# Patient Record
Sex: FEMALE | URBAN - METROPOLITAN AREA
[De-identification: names, ages, dates, MRNs, and addresses within clinical notes are randomized per-mention and may not be internally consistent; named-entity substitution may affect disease eponyms.]

---

## 2024-08-12 ENCOUNTER — CLAIMS CREATED FROM THE CLAIM WINDOW (OUTPATIENT)
Dept: URBAN - METROPOLITAN AREA MEDICAL CENTER 1 | Facility: MEDICAL CENTER | Age: 69
End: 2024-08-12
Payer: COMMERCIAL

## 2024-08-12 DIAGNOSIS — K21.9 ACID REFLUX: ICD-10-CM

## 2024-08-12 DIAGNOSIS — R13.19 CERVICAL DYSPHAGIA: ICD-10-CM

## 2024-08-12 PROCEDURE — G8427 DOCREV CUR MEDS BY ELIG CLIN: HCPCS | Performed by: REGISTERED NURSE

## 2024-08-12 PROCEDURE — 99222 1ST HOSP IP/OBS MODERATE 55: CPT | Performed by: REGISTERED NURSE

## 2024-08-12 PROCEDURE — 99254 IP/OBS CNSLTJ NEW/EST MOD 60: CPT | Performed by: REGISTERED NURSE

## 2024-08-13 ENCOUNTER — CLAIMS CREATED FROM THE CLAIM WINDOW (OUTPATIENT)
Dept: URBAN - METROPOLITAN AREA MEDICAL CENTER 1 | Facility: MEDICAL CENTER | Age: 69
End: 2024-08-13
Payer: COMMERCIAL

## 2024-08-13 ENCOUNTER — OUT OF OFFICE VISIT (OUTPATIENT)
Dept: URBAN - METROPOLITAN AREA MEDICAL CENTER 1 | Facility: MEDICAL CENTER | Age: 69
End: 2024-08-13

## 2024-08-13 DIAGNOSIS — K31.7 BENIGN GASTRIC POLYP: ICD-10-CM

## 2024-08-13 DIAGNOSIS — K22.4 ESOPHAGEAL DYSMOTILITY: ICD-10-CM

## 2024-08-13 DIAGNOSIS — R13.19 CERVICAL DYSPHAGIA: ICD-10-CM

## 2024-08-13 PROCEDURE — 43450 DILATE ESOPHAGUS 1/MULT PASS: CPT | Performed by: INTERNAL MEDICINE

## 2024-08-13 PROCEDURE — 43239 EGD BIOPSY SINGLE/MULTIPLE: CPT | Performed by: INTERNAL MEDICINE

## 2024-08-13 PROCEDURE — 43235 EGD DIAGNOSTIC BRUSH WASH: CPT | Performed by: INTERNAL MEDICINE

## 2024-08-13 PROCEDURE — 99232 SBSQ HOSP IP/OBS MODERATE 35: CPT | Performed by: INTERNAL MEDICINE

## 2024-08-14 ENCOUNTER — CLAIMS CREATED FROM THE CLAIM WINDOW (OUTPATIENT)
Dept: URBAN - METROPOLITAN AREA MEDICAL CENTER 1 | Facility: MEDICAL CENTER | Age: 69
End: 2024-08-14
Payer: COMMERCIAL

## 2024-08-14 DIAGNOSIS — K58.1 IRRITABLE BOWEL SYNDROME WITH CONSTIPATION: ICD-10-CM

## 2024-08-14 DIAGNOSIS — K22.4 ESOPHAGEAL DYSMOTILITY: ICD-10-CM

## 2024-08-14 DIAGNOSIS — R13.19 CERVICAL DYSPHAGIA: ICD-10-CM

## 2024-08-14 DIAGNOSIS — K21.9 ACID REFLUX: ICD-10-CM

## 2024-08-14 PROCEDURE — 99232 SBSQ HOSP IP/OBS MODERATE 35: CPT | Performed by: REGISTERED NURSE

## 2024-08-21 ENCOUNTER — OFFICE VISIT (OUTPATIENT)
Dept: URBAN - NONMETROPOLITAN AREA CLINIC 2 | Facility: CLINIC | Age: 69
End: 2024-08-21
Payer: COMMERCIAL

## 2024-08-21 ENCOUNTER — DASHBOARD ENCOUNTERS (OUTPATIENT)
Age: 69
End: 2024-08-21

## 2024-08-21 VITALS
SYSTOLIC BLOOD PRESSURE: 130 MMHG | DIASTOLIC BLOOD PRESSURE: 82 MMHG | WEIGHT: 107.4 LBS | BODY MASS INDEX: 17.9 KG/M2 | HEART RATE: 80 BPM | HEIGHT: 65 IN

## 2024-08-21 DIAGNOSIS — K21.9 GASTROESOPHAGEAL REFLUX DISEASE, UNSPECIFIED WHETHER ESOPHAGITIS PRESENT: ICD-10-CM

## 2024-08-21 DIAGNOSIS — Z12.11 SCREENING FOR COLON CANCER: ICD-10-CM

## 2024-08-21 DIAGNOSIS — R13.19 OTHER DYSPHAGIA: ICD-10-CM

## 2024-08-21 PROBLEM — 40739000: Status: ACTIVE | Noted: 2024-08-21

## 2024-08-21 PROBLEM — 305058001: Status: ACTIVE | Noted: 2024-08-21

## 2024-08-21 PROBLEM — 235595009: Status: ACTIVE | Noted: 2024-08-21

## 2024-08-21 PROCEDURE — 99213 OFFICE O/P EST LOW 20 MIN: CPT

## 2024-08-21 RX ORDER — PANTOPRAZOLE SODIUM 40 MG/1
1 TABLET TABLET, DELAYED RELEASE ORAL ONCE A DAY
Status: ACTIVE | COMMUNITY

## 2024-08-21 RX ORDER — LINACLOTIDE 72 UG/1
1 CAPSULE AT LEAST 30 MINUTES BEFORE THE FIRST MEAL OF THE DAY ON AN EMPTY STOMACH CAPSULE, GELATIN COATED ORAL ONCE A DAY
Status: ACTIVE | COMMUNITY

## 2024-08-21 RX ORDER — PANTOPRAZOLE SODIUM 20 MG/1
1 TABLET TABLET, DELAYED RELEASE ORAL TWICE A DAY
Qty: 180 TABLET | Refills: 3 | OUTPATIENT
Start: 2024-08-21

## 2024-08-21 RX ORDER — SODIUM CHLORIDE TAB 1 GM 1 G
AS DIRECTED TAB MISCELLANEOUS
Status: ACTIVE | COMMUNITY

## 2024-08-21 NOTE — HPI-TODAY'S VISIT:
8/21/2024 Evelin returns following hospital work up for dysphagia with EGD 8/13/24 with Dr. Hauser with dilation and thickened esophageal wall concerning for scleroderma. She was felt to have a complex case with some dysphagia component including her hardware from neck fusions. She continues with modifications, having trouble with noodles and chicken notably. She has had several hospitalizations this year following her syncopal episode with brain hemorrhage and will follow up with  Cardiology in Sept to review her monitor. She no longer sees Neurosurgery and continues with Nephrology for SIADH. States she is  Eating better at home than she did at the hospital and Continues pantoprazole 40 mg she takes in the evenings which upset her stomach for some days. Due for colonoscopy 2026- up to date with previous done at outside location. Taking Linzess 72 mcg but not daily to manage constipation. Denies any blood in her stool or pain with swallowing.She denies abdominal pain but endorses increased borborygmi. She will see SLP today at her home for evaluation and recommendations. She continues working toward regaining the weight she has lost since her fall. SP

## 2024-09-18 ENCOUNTER — OFFICE VISIT (OUTPATIENT)
Dept: URBAN - NONMETROPOLITAN AREA CLINIC 2 | Facility: CLINIC | Age: 69
End: 2024-09-18
Payer: COMMERCIAL

## 2024-09-18 VITALS
SYSTOLIC BLOOD PRESSURE: 147 MMHG | HEART RATE: 71 BPM | WEIGHT: 106.8 LBS | DIASTOLIC BLOOD PRESSURE: 73 MMHG | HEIGHT: 65 IN | BODY MASS INDEX: 17.79 KG/M2

## 2024-09-18 DIAGNOSIS — E87.1 HYPONATREMIA: ICD-10-CM

## 2024-09-18 DIAGNOSIS — R13.19 ESOPHAGEAL DYSPHAGIA: ICD-10-CM

## 2024-09-18 DIAGNOSIS — K59.09 CHRONIC CONSTIPATION: ICD-10-CM

## 2024-09-18 PROCEDURE — 99214 OFFICE O/P EST MOD 30 MIN: CPT | Performed by: INTERNAL MEDICINE

## 2024-09-18 RX ORDER — PANTOPRAZOLE SODIUM 40 MG/1
1 TABLET TABLET, DELAYED RELEASE ORAL ONCE A DAY
Status: ACTIVE | COMMUNITY

## 2024-09-18 RX ORDER — SODIUM CHLORIDE TAB 1 GM 1 G
AS DIRECTED TAB MISCELLANEOUS
Status: ACTIVE | COMMUNITY

## 2024-09-18 RX ORDER — PANTOPRAZOLE SODIUM 20 MG/1
1 TABLET TABLET, DELAYED RELEASE ORAL TWICE A DAY
Qty: 180 TABLET | Refills: 3 | Status: ACTIVE | COMMUNITY
Start: 2024-08-21

## 2024-09-18 RX ORDER — LINACLOTIDE 72 UG/1
1 CAPSULE AT LEAST 30 MINUTES BEFORE THE FIRST MEAL OF THE DAY ON AN EMPTY STOMACH CAPSULE, GELATIN COATED ORAL ONCE A DAY
Status: ACTIVE | COMMUNITY

## 2024-09-18 RX ORDER — POLYETHYLENE GLYCOL 3350, SODIUM SULFATE ANHYDROUS, SODIUM BICARBONATE, SODIUM CHLORIDE, POTASSIUM CHLORIDE 236; 22.74; 6.74; 5.86; 2.97 G/4L; G/4L; G/4L; G/4L; G/4L
4000 MILLITERS POWDER, FOR SOLUTION ORAL ONCE
Qty: 4000 MILLILITER | Refills: 0 | OUTPATIENT
Start: 2024-09-18 | End: 2024-09-19

## 2024-09-18 RX ORDER — LINACLOTIDE 145 UG/1
1 CAPSULE AT LEAST 30 MINUTES BEFORE THE FIRST MEAL OF THE DAY ON AN EMPTY STOMACH CAPSULE, GELATIN COATED ORAL ONCE A DAY
Qty: 30 | Refills: 3 | OUTPATIENT
Start: 2024-09-18 | End: 2025-01-15

## 2024-09-18 NOTE — HPI-TODAY'S VISIT:
9/18/24: Ms. Love returns to GI clinic for evaluation of chronic constipation.  She has been on Linzess 72 mcg daily.  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  She had an EGD with Savary dilation to 54 with Dr. Arroyo and biopsies were normal.  She has a history of cervical neck fusion and suspect this is contributing.  She has a history of baseline constipation but has been more constipated since hospitalization.  She has been straining and her hemorrhoids have been bothering her.  She has some bloating and gas discomfort.    8/21/2024 Evelin returns following hospital work up for dysphagia with EGD 8/13/24 with Dr. Hauser with dilation and thickened esophageal wall concerning for scleroderma. She was felt to have a complex case with some dysphagia component including her hardware from neck fusions. She continues with modifications, having trouble with noodles and chicken notably. She has had several hospitalizations this year following her syncopal episode with brain hemorrhage and will follow up with  Cardiology in Sept to review her monitor. She no longer sees Neurosurgery and continues with Nephrology for SIADH. States she is  Eating better at home than she did at the hospital and Continues pantoprazole 40 mg she takes in the evenings which upset her stomach for some days. Due for colonoscopy 2026- up to date with previous done at outside location.  Taking Linzess 72 mcg but not daily to manage constipation.  Denies any blood in her stool or pain with swallowing.She denies abdominal pain but endorses increased borborygmi. She will see SLP today at her home for evaluation and recommendations.  She continues working toward regaining the weight she has lost since her fall.  SP

## 2024-09-19 ENCOUNTER — TELEPHONE ENCOUNTER (OUTPATIENT)
Dept: URBAN - NONMETROPOLITAN AREA CLINIC 2 | Facility: CLINIC | Age: 69
End: 2024-09-19

## 2024-09-19 ENCOUNTER — WEB ENCOUNTER (OUTPATIENT)
Dept: URBAN - NONMETROPOLITAN AREA CLINIC 2 | Facility: CLINIC | Age: 69
End: 2024-09-19

## 2024-09-19 LAB
BUN/CREATININE RATIO: (no result)
CALCIUM: 9.4
CARBON DIOXIDE: 28
CHLORIDE: 98
CREATININE: 0.57
EGFR: 99
GLUCOSE: 113
MAGNESIUM: 1.9
POTASSIUM: 4.3
SODIUM: 136
UREA NITROGEN (BUN): 7

## 2024-09-24 ENCOUNTER — TELEPHONE ENCOUNTER (OUTPATIENT)
Dept: URBAN - METROPOLITAN AREA CLINIC 118 | Facility: CLINIC | Age: 69
End: 2024-09-24

## 2024-09-24 ENCOUNTER — OFFICE VISIT (OUTPATIENT)
Dept: URBAN - NONMETROPOLITAN AREA CLINIC 2 | Facility: CLINIC | Age: 69
End: 2024-09-24

## 2024-09-24 RX ORDER — LINACLOTIDE 290 UG/1
1 CAPSULE AT LEAST 30 MINUTES BEFORE THE FIRST MEAL OF THE DAY ON AN EMPTY STOMACH CAPSULE, GELATIN COATED ORAL ONCE A DAY
Qty: 30 | Refills: 11 | OUTPATIENT
Start: 2024-09-24 | End: 2025-09-19

## 2024-09-25 ENCOUNTER — WEB ENCOUNTER (OUTPATIENT)
Dept: URBAN - NONMETROPOLITAN AREA CLINIC 2 | Facility: CLINIC | Age: 69
End: 2024-09-25

## 2024-09-25 RX ORDER — LINACLOTIDE 145 UG/1
1 CAPSULE AT LEAST 30 MINUTES BEFORE THE FIRST MEAL OF THE DAY ON AN EMPTY STOMACH CAPSULE, GELATIN COATED ORAL ONCE A DAY
Qty: 90 | Refills: 3 | OUTPATIENT
Start: 2024-09-26 | End: 2025-09-21

## 2024-10-31 ENCOUNTER — OFFICE VISIT (OUTPATIENT)
Dept: URBAN - NONMETROPOLITAN AREA CLINIC 2 | Facility: CLINIC | Age: 69
End: 2024-10-31

## 2024-10-31 VITALS
SYSTOLIC BLOOD PRESSURE: 118 MMHG | TEMPERATURE: 98.1 F | WEIGHT: 106 LBS | BODY MASS INDEX: 17.66 KG/M2 | DIASTOLIC BLOOD PRESSURE: 75 MMHG | HEART RATE: 70 BPM | HEIGHT: 65 IN

## 2024-10-31 RX ORDER — SODIUM CHLORIDE TAB 1 GM 1 G
AS DIRECTED TAB MISCELLANEOUS
Status: ACTIVE | COMMUNITY

## 2024-10-31 RX ORDER — LINACLOTIDE 145 UG/1
1 CAPSULE AT LEAST 30 MINUTES BEFORE THE FIRST MEAL OF THE DAY ON AN EMPTY STOMACH CAPSULE, GELATIN COATED ORAL ONCE A DAY
Qty: 90 | Refills: 3 | Status: ACTIVE | COMMUNITY
Start: 2024-09-26 | End: 2025-09-21

## 2024-10-31 RX ORDER — LINACLOTIDE 290 UG/1
1 CAPSULE AT LEAST 30 MINUTES BEFORE THE FIRST MEAL OF THE DAY ON AN EMPTY STOMACH CAPSULE, GELATIN COATED ORAL ONCE A DAY
Qty: 30 | Refills: 11 | Status: ACTIVE | COMMUNITY
Start: 2024-09-24 | End: 2025-09-19

## 2024-10-31 RX ORDER — LINACLOTIDE 145 UG/1
1 CAPSULE AT LEAST 30 MINUTES BEFORE THE FIRST MEAL OF THE DAY ON AN EMPTY STOMACH CAPSULE, GELATIN COATED ORAL ONCE A DAY
Qty: 30 | Refills: 3 | Status: ACTIVE | COMMUNITY
Start: 2024-09-18 | End: 2025-01-15

## 2024-10-31 RX ORDER — PANTOPRAZOLE SODIUM 40 MG/1
1 TABLET TABLET, DELAYED RELEASE ORAL ONCE A DAY
Status: ACTIVE | COMMUNITY

## 2024-10-31 RX ORDER — LINACLOTIDE 72 UG/1
1 CAPSULE AT LEAST 30 MINUTES BEFORE THE FIRST MEAL OF THE DAY ON AN EMPTY STOMACH CAPSULE, GELATIN COATED ORAL ONCE A DAY
Status: ACTIVE | COMMUNITY

## 2024-10-31 RX ORDER — PANTOPRAZOLE SODIUM 20 MG/1
1 TABLET TABLET, DELAYED RELEASE ORAL TWICE A DAY
Qty: 180 TABLET | Refills: 3 | Status: ACTIVE | COMMUNITY
Start: 2024-08-21

## 2024-10-31 NOTE — HPI-TODAY'S VISIT:
8/21/2024 Evelin returns following hospital work up for dysphagia with EGD 8/13/24 with Dr. Hauser with dilation and thickened esophageal wall concerning for scleroderma. She was felt to have a complex case with some dysphagia component including her hardware from neck fusions. She continues with modifications, having trouble with noodles and chicken notably. She has had several hospitalizations this year following her syncopal episode with brain hemorrhage and will follow up with  Cardiology in Sept to review her monitor. She no longer sees Neurosurgery and continues with Nephrology for SIADH. States she is  Eating better at home than she did at the hospital and Continues pantoprazole 40 mg she takes in the evenings which upset her stomach for some days. Due for colonoscopy 2026- up to date with previous done at outside location.  Taking Linzess 72 mcg but not daily to manage constipation.  Denies any blood in her stool or pain with swallowing.She denies abdominal pain but endorses increased borborygmi. She will see SLP today at her home for evaluation and recommendations.  She continues working toward regaining the weight she has lost since her fall.  SP 9/18/24: Ms. Love returns to GI clinic for evaluation of chronic constipation.  She has been on Linzess 72 mcg daily.  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  She had an EGD with Savary dilation to 54 with Dr. Arroyo and biopsies were normal.  She has a history of cervical neck fusion and suspect this is contributing.  She has a history of baseline constipation but has been more constipated since hospitalization.  She has been straining and her hemorrhoids have been bothering her.  She has some bloating and gas discomfort. 10/31/2024 Lydia returns for follow-up of scleroderma esophagus as well as constipation.  She currently ports she is actually doing pretty well as terms of her swallowing.  She has had about 1 or 2 episodes of dysphagia, but these have been very minor.  They mostly involve large pieces of chicken and we discussed small bites.  In terms of her constipation, she seems to be doing okay on the Linzess, but sometimes has a difficult time titrating it.  We discussed continuing Linzess 72, but she may hold it if she has an event. Sb

## 2024-11-11 ENCOUNTER — OFFICE VISIT (OUTPATIENT)
Dept: URBAN - NONMETROPOLITAN AREA CLINIC 2 | Facility: CLINIC | Age: 69
End: 2024-11-11

## 2025-01-30 ENCOUNTER — OFFICE VISIT (OUTPATIENT)
Dept: URBAN - NONMETROPOLITAN AREA CLINIC 2 | Facility: CLINIC | Age: 70
End: 2025-01-30
Payer: COMMERCIAL

## 2025-01-30 VITALS
WEIGHT: 114 LBS | DIASTOLIC BLOOD PRESSURE: 74 MMHG | TEMPERATURE: 98 F | HEIGHT: 65 IN | HEART RATE: 69 BPM | SYSTOLIC BLOOD PRESSURE: 123 MMHG | BODY MASS INDEX: 18.99 KG/M2

## 2025-01-30 DIAGNOSIS — K59.09 CHRONIC CONSTIPATION: ICD-10-CM

## 2025-01-30 DIAGNOSIS — R13.19 ESOPHAGEAL DYSPHAGIA: ICD-10-CM

## 2025-01-30 PROCEDURE — 99214 OFFICE O/P EST MOD 30 MIN: CPT | Performed by: NURSE PRACTITIONER

## 2025-01-30 RX ORDER — PANTOPRAZOLE SODIUM 40 MG/1
1 TABLET TABLET, DELAYED RELEASE ORAL ONCE A DAY
Status: ACTIVE | COMMUNITY

## 2025-01-30 RX ORDER — LINACLOTIDE 72 UG/1
1 CAPSULE AT LEAST 30 MINUTES BEFORE THE FIRST MEAL OF THE DAY ON AN EMPTY STOMACH CAPSULE, GELATIN COATED ORAL ONCE A DAY
Qty: 90 | Refills: 3 | Status: ACTIVE | COMMUNITY
Start: 2024-09-26 | End: 2025-09-21

## 2025-01-30 NOTE — HPI-TODAY'S VISIT:
8/21/2024 Evelin returns following hospital work up for dysphagia with EGD 8/13/24 with Dr. Hauser with dilation and thickened esophageal wall concerning for scleroderma. She was felt to have a complex case with some dysphagia component including her hardware from neck fusions. She continues with modifications, having trouble with noodles and chicken notably. She has had several hospitalizations this year following her syncopal episode with brain hemorrhage and will follow up with  Cardiology in Sept to review her monitor. She no longer sees Neurosurgery and continues with Nephrology for SIADH. States she is  Eating better at home than she did at the hospital and Continues pantoprazole 40 mg she takes in the evenings which upset her stomach for some days. Due for colonoscopy 2026- up to date with previous done at outside location.  Taking Linzess 72 mcg but not daily to manage constipation.  Denies any blood in her stool or pain with swallowing.She denies abdominal pain but endorses increased borborygmi. She will see SLP today at her home for evaluation and recommendations.  She continues working toward regaining the weight she has lost since her fall.  SP 9/18/24: Ms. Love returns to GI clinic for evaluation of chronic constipation.  She has been on Linzess 72 mcg daily.  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  She had an EGD with Savary dilation to 54 with Dr. Arroyo and biopsies were normal.  She has a history of cervical neck fusion and suspect this is contributing.  She has a history of baseline constipation but has been more constipated since hospitalization.  She has been straining and her hemorrhoids have been bothering her.  She has some bloating and gas discomfort. 1/30/25 Lydia returns for follow-up of scleroderma esophagus as well as constipation.  She currently ports she is actually doing pretty well as terms of her swallowing.  She has had about 1 or 2 episodes of dysphagia, but these have been very minor. sounds like peanut butter a trigger.  In terms of her constipation, she seems to be doing okay on the Linzess, but sometimes has a difficult time titrating it.  We discussed continuing Linzess 72, but she may hold it if she has an event. some occasional belching. Sb

## 2025-06-20 ENCOUNTER — TELEPHONE ENCOUNTER (OUTPATIENT)
Dept: URBAN - NONMETROPOLITAN AREA CLINIC 2 | Facility: CLINIC | Age: 70
End: 2025-06-20

## 2025-06-20 RX ORDER — LINACLOTIDE 72 UG/1
1 CAPSULE AT LEAST 30 MINUTES BEFORE THE FIRST MEAL OF THE DAY ON AN EMPTY STOMACH CAPSULE, GELATIN COATED ORAL ONCE A DAY
Qty: 90 | Refills: 3
Start: 2024-09-26 | End: 2026-06-15